# Patient Record
Sex: FEMALE | Race: WHITE | NOT HISPANIC OR LATINO | URBAN - METROPOLITAN AREA
[De-identification: names, ages, dates, MRNs, and addresses within clinical notes are randomized per-mention and may not be internally consistent; named-entity substitution may affect disease eponyms.]

---

## 2022-06-21 ENCOUNTER — OUT OF OFFICE VISIT (OUTPATIENT)
Dept: URBAN - METROPOLITAN AREA MEDICAL CENTER 11 | Facility: MEDICAL CENTER | Age: 52
End: 2022-06-21
Payer: SELF-PAY

## 2022-06-21 DIAGNOSIS — Z98.84 BARIATRIC SURG STAT-UNSP: ICD-10-CM

## 2022-06-21 DIAGNOSIS — R13.19 CERVICAL DYSPHAGIA: ICD-10-CM

## 2022-06-21 PROCEDURE — 99223 1ST HOSP IP/OBS HIGH 75: CPT | Performed by: INTERNAL MEDICINE

## 2022-06-21 PROCEDURE — G8427 DOCREV CUR MEDS BY ELIG CLIN: HCPCS | Performed by: INTERNAL MEDICINE

## 2024-02-12 ENCOUNTER — OV NP (OUTPATIENT)
Dept: URBAN - METROPOLITAN AREA CLINIC 82 | Facility: CLINIC | Age: 54
End: 2024-02-12
Payer: SELF-PAY

## 2024-02-12 VITALS
HEART RATE: 114 BPM | SYSTOLIC BLOOD PRESSURE: 105 MMHG | DIASTOLIC BLOOD PRESSURE: 66 MMHG | BODY MASS INDEX: 33.13 KG/M2 | TEMPERATURE: 97.3 F | WEIGHT: 180 LBS | HEIGHT: 62 IN

## 2024-02-12 DIAGNOSIS — Z93.3 COLOSTOMY IN PLACE: ICD-10-CM

## 2024-02-12 DIAGNOSIS — F11.90 CHRONIC NARCOTIC USE: ICD-10-CM

## 2024-02-12 DIAGNOSIS — K59.09 CHRONIC CONSTIPATION: ICD-10-CM

## 2024-02-12 DIAGNOSIS — K21.9 CHRONIC GERD: ICD-10-CM

## 2024-02-12 PROBLEM — 302112009: Status: ACTIVE | Noted: 2024-02-12

## 2024-02-12 PROBLEM — 70545002: Status: ACTIVE | Noted: 2024-02-12

## 2024-02-12 PROBLEM — 235595009: Status: ACTIVE | Noted: 2024-02-12

## 2024-02-12 PROCEDURE — 992 APS NON BILLABLE: Performed by: INTERNAL MEDICINE

## 2024-02-12 NOTE — HPI-TODAY'S VISIT:
53-year-old female with significant past medical history for paroxysmal A-fib, COPD, back surgery chronic back pain on chronic narcotic use with a foot drop had extensive significant past medical history in the past several years came into the office for the evaluation of revision of colostomy.  Did not have prior records except for the recent hospitalization in January 2024.  Patient based upon her records patient had extensive hospitalization last year for after COVID and had respiratory failure.  Prior to that a few years ago patient reports being septic and anoxic brain damage after seizure and that led to seizure and also had a severe sacral decub does that led to sepsis and hence she had a loop colostomy placed as per the records however patient and her  could not provide any history they are poor historians.  Patient denies any bowel movements to the rectum however she had 1 episode of hard lump of stool about 18 months ago.  She is on chronic narcotics and she has an extensive medications in the regular reviewed.  Patient reports having hard stool through the colostomy bag.  She reports having burning sensation around the colostomy site and she had wound wound care visit and on a day and a frequent basis and once the wound the sacral decubitus is healed she was told that she will be referred to see a surgeon for the colostomy repair.  Could not find all the real reasons for the colostomy placement and based upon the sacral decubitus history most likely to avoid fecal soiling and also to help for better healing of the also likely was the reason.  Patient currently denies any blood in the stools.  Recent hospitalization records were all reviewed.

## 2024-02-12 NOTE — PHYSICAL EXAM CONSTITUTIONAL:
well developed, well nourished , in no acute distress , in wheel chair  , normal communication ability,foot drop noted

## 2024-02-12 NOTE — PHYSICAL EXAM GASTROINTESTINAL
Abdomen , soft, nontender, nondistended , no guarding or rigidity , no masses palpable , normal bowel sounds , Liver and Spleen,  no hepatosplenomegaly , liver nontender , prior surgical scars noted and colostomy with hard brown stool noted.